# Patient Record
Sex: FEMALE | Race: BLACK OR AFRICAN AMERICAN | Employment: UNEMPLOYED | URBAN - METROPOLITAN AREA
[De-identification: names, ages, dates, MRNs, and addresses within clinical notes are randomized per-mention and may not be internally consistent; named-entity substitution may affect disease eponyms.]

---

## 2023-02-01 RX ORDER — ACETAMINOPHEN AND CODEINE PHOSPHATE 120; 12 MG/5ML; MG/5ML
1 SOLUTION ORAL DAILY
COMMUNITY

## 2023-02-01 RX ORDER — LAMOTRIGINE 25 MG/1
50 TABLET ORAL DAILY
COMMUNITY

## 2023-02-01 RX ORDER — QUETIAPINE FUMARATE 100 MG/1
100 TABLET, FILM COATED ORAL DAILY
COMMUNITY

## 2023-02-01 RX ORDER — HYDROCHLOROTHIAZIDE 12.5 MG/1
12.5 TABLET ORAL DAILY
COMMUNITY

## 2023-02-01 RX ORDER — ERGOCALCIFEROL 1.25 MG/1
50000 CAPSULE ORAL
COMMUNITY

## 2024-09-09 PROBLEM — I21.3 STEMI (ST ELEVATION MYOCARDIAL INFARCTION) (HCC): Status: ACTIVE | Noted: 2024-09-09

## 2024-09-22 PROBLEM — R50.9 FEBRILE ILLNESS: Status: ACTIVE | Noted: 2024-09-22

## 2024-09-29 PROBLEM — I47.20 V-TACH (HCC): Status: ACTIVE | Noted: 2024-09-29

## 2024-11-21 PROBLEM — I50.9 ACUTE CONGESTIVE HEART FAILURE, UNSPECIFIED HEART FAILURE TYPE (HCC): Status: ACTIVE | Noted: 2024-11-21

## 2024-12-15 PROBLEM — I63.512 ACUTE ISCHEMIC CEREBROVASCULAR ACCIDENT (CVA) INVOLVING LEFT MIDDLE CEREBRAL ARTERY TERRITORY (HCC): Status: ACTIVE | Noted: 2024-12-15

## 2025-01-19 PROBLEM — R07.9 ACUTE CHEST PAIN: Status: ACTIVE | Noted: 2025-01-19

## 2025-04-14 ENCOUNTER — TELEPHONE (OUTPATIENT)
Age: 35
End: 2025-04-14

## 2025-04-14 NOTE — TELEPHONE ENCOUNTER
New Patient referred by Dr Amico . Patient was given the address to [facility] and directions to clinic.  Patient was advised to arrive 15 minutes early for registration, bring medication bottles, as well as insurance cards and ID. Patient was provided the clinic phone number for any questions, in addition to my extension. Patient is agreeable and understanding of all instructions with no further questions or concerns at this time.    Insurance was verified and patient was scheduled with Dr. Cho.     Additional records requested- Records request sent to PSANJELICA Matias, YAN

## 2025-04-20 ASSESSMENT — PATIENT HEALTH QUESTIONNAIRE - PHQ9
2. FEELING DOWN, DEPRESSED OR HOPELESS: SEVERAL DAYS
1. LITTLE INTEREST OR PLEASURE IN DOING THINGS: SEVERAL DAYS
SUM OF ALL RESPONSES TO PHQ9 QUESTIONS 1 & 2: 2
SUM OF ALL RESPONSES TO PHQ QUESTIONS 1-9: 2
1. LITTLE INTEREST OR PLEASURE IN DOING THINGS: SEVERAL DAYS
2. FEELING DOWN, DEPRESSED OR HOPELESS: SEVERAL DAYS
SUM OF ALL RESPONSES TO PHQ QUESTIONS 1-9: 2

## 2025-04-21 ENCOUNTER — TELEPHONE (OUTPATIENT)
Age: 35
End: 2025-04-21

## 2025-04-21 NOTE — TELEPHONE ENCOUNTER
Per Dr. hCo  Spoke with patient in reference to upcoming appointment. Patient has just seen [facility] and is scheduled to meet with [facility]. Communicated to patient that Dr. Cho is happy to see hear however, she does not want her to hear duplicate information. Patient is agreeable and understanding to this. I informed the patient that we will be here if she would like to schedule in the future.    LOVE Sanchez